# Patient Record
Sex: MALE | Race: WHITE | NOT HISPANIC OR LATINO | Employment: OTHER | URBAN - NONMETROPOLITAN AREA
[De-identification: names, ages, dates, MRNs, and addresses within clinical notes are randomized per-mention and may not be internally consistent; named-entity substitution may affect disease eponyms.]

---

## 2019-05-15 ENCOUNTER — PREP FOR SURGERY (OUTPATIENT)
Dept: OTHER | Facility: HOSPITAL | Age: 69
End: 2019-05-15

## 2019-05-15 DIAGNOSIS — R07.9 CHEST PAIN, UNSPECIFIED TYPE: Primary | ICD-10-CM

## 2019-05-15 DIAGNOSIS — Z82.49 FAMILY HISTORY OF ISCHEMIC HEART DISEASE: ICD-10-CM

## 2019-05-15 DIAGNOSIS — I45.10 RIGHT BUNDLE BRANCH BLOCK: ICD-10-CM

## 2019-05-15 RX ORDER — SODIUM CHLORIDE 0.9 % (FLUSH) 0.9 %
1-10 SYRINGE (ML) INJECTION AS NEEDED
Status: CANCELLED | OUTPATIENT
Start: 2019-05-20

## 2019-05-15 RX ORDER — SODIUM CHLORIDE 9 MG/ML
75 INJECTION, SOLUTION INTRAVENOUS CONTINUOUS
Status: CANCELLED | OUTPATIENT
Start: 2019-05-20

## 2019-05-15 RX ORDER — SODIUM CHLORIDE 0.9 % (FLUSH) 0.9 %
3 SYRINGE (ML) INJECTION EVERY 12 HOURS SCHEDULED
Status: CANCELLED | OUTPATIENT
Start: 2019-05-20

## 2019-05-19 NOTE — H&P
Cardiology History and Physical Note        Patient Name: Mati Roach  Age/Sex: 68 y.o. male  : 1950  MRN: [id number]    Date of Admission : 2019    Primary care Physician: Provider, No Known    Reason for Admission: Chest pain unstable angina pectoris left heart catheterization    Subjective:       Chief Complaint: Chest pain.       History of Present Illness:  Mati Roach is a 68 y.o. male     There is no height or weight on file to calculate BMI.  With a past medical history significant for transient ischemic attack in  with evaluation at [facility], arterial hypertension, hypertensive heart disease, hyperlipidemia, insulin requiring diabetes, gastroesophageal reflux disease, and abnormal resting electrocardiogram with right bundle branch configuration and family history for coronary artery disease.    Patient was recently evaluated at [facility] and was diagnosed with chronic obstructive lung disease.  Patient was evaluated for symptoms of lightheaded dizziness.    Patient presents for further evaluation for symptoms of chest pain.  Patient last  was sleeping  in the recliner due to symptoms of shortness of breath.  Patient has had symptoms of lightheaded dizziness.  Patient does complain of having bilateral lower extremity edema.    Patient complaining of having symptoms of chest pain radiating to the back.  Patient first episode of chest pain was on  with recurrent symptoms of chest pain on and off.  Patient has had symptoms of palpitation along with lightheaded dizziness and near syncope.  Patient resting electrocardiogram done reveals sinus rhythm with a right bundle branch configuration.  Patient on further questioning denies any nausea vomiting.  Patient denies any paroxysmal nocturnal dyspnea orthopnea.    Patient 10 point review of system except for what stated in the history of present illness and negative.      Past Medical History:  1.   Chest pain.  2.  Abnormal resting electrocardiogram with right bundle branch configuration.  3.  Arterial hypertension.  4.  Hypertensive heart disease.  5.  Hyperlipidemia.  6.  Insulin requiring diabetes.  7.  Gastroesophageal reflux disease.  8.  Transient ischemic attack in 2004 with evaluation at [facility].    Past Surgical History:  1.  Cervical spine surgery x2.  2.  Cholecystectomy.  3.  Total knee arthroplasty.  4.  Fracture of the humerus.    Family History:  Family history is significant for father who had myocardial infarction.    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  Male.  2.  Arterial hypertension.  3.  Hyperlipidemia.  4.  Diabetes.  5.  Family history for coronary artery disease.    Allergies:  Allergies not on file    Home Medication::  Prior to Admission medications    Not on File   1.   Nexium 40 mg every day.  2.  Plavix 75 mg every day.  3.  HCTZ 25 mg every day.  4.  Pravachol 40 mg at bedtime.  5.  Reglan 5 mg twice a day.  6.  Aspirin 81 mg every day.        Review of Systems   Constitutional: Negative for chills, fever and unexpected weight change.   HENT: Negative for hearing loss and nosebleeds.    Eyes: Negative for visual disturbance.   Respiratory: Positive for chest tightness and shortness of breath. Negative for cough and wheezing.    Cardiovascular: Positive for chest pain. Negative for palpitations and leg swelling.   Gastrointestinal: Negative for abdominal pain, blood in stool, constipation, diarrhea, nausea and vomiting.   Endocrine: Negative for cold intolerance, heat intolerance, polydipsia, polyphagia and polyuria.   Genitourinary: Negative for hematuria.   Musculoskeletal: Negative for joint swelling, myalgias and neck pain.   Skin: Negative for color change, rash and wound.   Neurological: Positive for light-headedness. Negative for dizziness, seizures, syncope, numbness and  headaches.   Hematological: Does not bruise/bleed easily.         Objective:     Objective:         There is no height or weight on file to calculate BMI.       Physical Exam   Constitutional: He is oriented to person, place, and time. He appears well-developed and well-nourished.   HENT:   Head: Normocephalic and atraumatic.   Left Ear: External ear normal.   Nose: Nose normal.   Mouth/Throat: Oropharynx is clear and moist.   Eyes: Conjunctivae and EOM are normal. Pupils are equal, round, and reactive to light. No scleral icterus.   Neck: Normal range of motion. Neck supple. No JVD present. No tracheal deviation present. No thyromegaly present.   Cardiovascular: Normal rate and regular rhythm.   Regular S1 and S2 with a soft systolic murmur best heard at the apex   Pulmonary/Chest: Breath sounds normal. No stridor.   Abdominal: Bowel sounds are normal.   Musculoskeletal: Normal range of motion.   Lymphadenopathy:     He has no cervical adenopathy.   Neurological: He is alert and oriented to person, place, and time. He has normal reflexes.   Skin: Skin is warm and dry.   Psychiatric: He has a normal mood and affect. His behavior is normal. Judgment and thought content normal.         Lab Review:           Invalid input(s): LABALBU, PROT                                    EKG:   ECG/EMG Results (last 24 hours)     ** No results found for the last 24 hours. **          Imaging:  Imaging Results (last 24 hours)     ** No results found for the last 24 hours. **          I personally viewed and interpreted the patient's EKG/Telemetry data.    Assessment:   1.  Chest pain.  2.  Abnormal resting echocardiogram with a right bundle branch configuration.  3.  Arterial hypertension.  4.  Hypertensive heart disease.  5.  Diabetes.          Plan:   1.  Chest pain.  Patient has had increasing episodes and frequency of chest pain suggestive of crescendo unstable angina pectoris.  Patient has been recommended to undergo a  transthoracic echocardiogram to evaluate the left ventricular systolic function.  Patient has been recommended a coronary angiogram.  Risk benefit treatment option for the coronary angiogram were discussed with the patient and informed consent was obtained.  Plan was to proceed with a coronary angiogram on 5/20/2019.  2.  Abnormal resting electrocardiogram with a right bundle branch configuration.  Patient has been recommended to undergo a head up tilt table testing with symptoms of lightheaded dizziness.  Patient is currently not on any AV blocking medication.  Patient has been recommended to continue to follow the heart rate in the blood pressure.  3.  Arterial hypertension.  Patient blood pressure 140/80.  Patient at the present time has been recommended to continue with the present dose of the nifedipine 30 mg daily along with HCTZ 25 mg daily.  4.  Hyperlipidemia.  Patient has been counseled on low-fat low-cholesterol diet and to continue with the present dose of the pravastatin.  5.  Diabetes.  Patient has been counseled on American diabetic Association diet.  Patient would be administered Mucomyst prior to the coronary angiogram and will check the renal function.  6.  Gastroesophageal reflux disease.  Patient is currently on Nexium 40 mg daily.  7.  History of transient ischemic attack.  Patient has had previous evaluation at [place].  Patient is currently on Plavix 75 mg every day.  Patient has not had any new symptoms of amaurosis fugax any weakness or tingling sensation in the upper lower extremity.    Above plan of management were discussed with the patient.        Time: time spent in face-to-face evaluation of greater than 55 minutes and interacting and formulating examining and discussing the plan with the patient with 50% of greater time spent in face-to-face interaction.    Charli Gay MD  05/19/19  6:01 PM      Dictated utilizing Dragon dictation.

## 2019-05-20 ENCOUNTER — HOSPITAL ENCOUNTER (OUTPATIENT)
Facility: HOSPITAL | Age: 69
Setting detail: HOSPITAL OUTPATIENT SURGERY
Discharge: HOME | End: 2019-05-20
Attending: INTERNAL MEDICINE | Admitting: INTERNAL MEDICINE

## 2019-05-20 VITALS
TEMPERATURE: 97.1 F | HEIGHT: 75 IN | BODY MASS INDEX: 31.3 KG/M2 | RESPIRATION RATE: 20 BRPM | DIASTOLIC BLOOD PRESSURE: 73 MMHG | WEIGHT: 251.77 LBS | HEART RATE: 68 BPM | SYSTOLIC BLOOD PRESSURE: 131 MMHG | OXYGEN SATURATION: 96 %

## 2019-05-20 DIAGNOSIS — R07.9 CHEST PAIN, UNSPECIFIED TYPE: ICD-10-CM

## 2019-05-20 DIAGNOSIS — Z82.49 FAMILY HISTORY OF ISCHEMIC HEART DISEASE: ICD-10-CM

## 2019-05-20 DIAGNOSIS — I45.10 RIGHT BUNDLE BRANCH BLOCK: ICD-10-CM

## 2019-05-20 LAB
ANION GAP SERPL CALCULATED.3IONS-SCNC: 13 MMOL/L
BASOPHILS # BLD AUTO: 0.11 10*3/MM3 (ref 0–0.2)
BASOPHILS NFR BLD AUTO: 1.5 % (ref 0–1.5)
BUN BLD-MCNC: 17 MG/DL (ref 8–23)
BUN/CREAT SERPL: 17.3 (ref 7–25)
CALCIUM SPEC-SCNC: 9.9 MG/DL (ref 8.6–10.5)
CHLORIDE SERPL-SCNC: 104 MMOL/L (ref 98–107)
CO2 SERPL-SCNC: 23 MMOL/L (ref 22–29)
CREAT BLD-MCNC: 0.98 MG/DL (ref 0.76–1.27)
DEPRECATED RDW RBC AUTO: 41.3 FL (ref 37–54)
EOSINOPHIL # BLD AUTO: 0.39 10*3/MM3 (ref 0–0.4)
EOSINOPHIL NFR BLD AUTO: 5.2 % (ref 0.3–6.2)
ERYTHROCYTE [DISTWIDTH] IN BLOOD BY AUTOMATED COUNT: 13.5 % (ref 12.3–15.4)
GFR SERPL CREATININE-BSD FRML MDRD: 76 ML/MIN/1.73
GLUCOSE BLD-MCNC: 121 MG/DL (ref 65–99)
HCT VFR BLD AUTO: 49.4 % (ref 37.5–51)
HGB BLD-MCNC: 16.1 G/DL (ref 13–17.7)
IMM GRANULOCYTES # BLD AUTO: 0.03 10*3/MM3 (ref 0–0.05)
IMM GRANULOCYTES NFR BLD AUTO: 0.4 % (ref 0–0.5)
INR PPP: 0.96 (ref 0.8–1.2)
LYMPHOCYTES # BLD AUTO: 1.57 10*3/MM3 (ref 0.7–3.1)
LYMPHOCYTES NFR BLD AUTO: 20.8 % (ref 19.6–45.3)
MCH RBC QN AUTO: 27.6 PG (ref 26.6–33)
MCHC RBC AUTO-ENTMCNC: 32.6 G/DL (ref 31.5–35.7)
MCV RBC AUTO: 84.6 FL (ref 79–97)
MONOCYTES # BLD AUTO: 0.81 10*3/MM3 (ref 0.1–0.9)
MONOCYTES NFR BLD AUTO: 10.7 % (ref 5–12)
NEUTROPHILS # BLD AUTO: 4.63 10*3/MM3 (ref 1.7–7)
NEUTROPHILS NFR BLD AUTO: 61.4 % (ref 42.7–76)
NRBC BLD AUTO-RTO: 0 /100 WBC (ref 0–0.2)
PLATELET # BLD AUTO: 288 10*3/MM3 (ref 140–450)
PMV BLD AUTO: 9.4 FL (ref 6–12)
POTASSIUM BLD-SCNC: 3.8 MMOL/L (ref 3.5–5.2)
PROTHROMBIN TIME: 12.6 SECONDS (ref 11.1–15.3)
RBC # BLD AUTO: 5.84 10*6/MM3 (ref 4.14–5.8)
SODIUM BLD-SCNC: 140 MMOL/L (ref 136–145)
WBC NRBC COR # BLD: 7.54 10*3/MM3 (ref 3.4–10.8)

## 2019-05-20 PROCEDURE — C1769 GUIDE WIRE: HCPCS | Performed by: INTERNAL MEDICINE

## 2019-05-20 PROCEDURE — 85610 PROTHROMBIN TIME: CPT | Performed by: INTERNAL MEDICINE

## 2019-05-20 PROCEDURE — 85025 COMPLETE CBC W/AUTO DIFF WBC: CPT | Performed by: INTERNAL MEDICINE

## 2019-05-20 PROCEDURE — C1894 INTRO/SHEATH, NON-LASER: HCPCS | Performed by: INTERNAL MEDICINE

## 2019-05-20 PROCEDURE — 0 IOPAMIDOL PER 1 ML: Performed by: INTERNAL MEDICINE

## 2019-05-20 PROCEDURE — C1760 CLOSURE DEV, VASC: HCPCS | Performed by: INTERNAL MEDICINE

## 2019-05-20 PROCEDURE — 80048 BASIC METABOLIC PNL TOTAL CA: CPT | Performed by: INTERNAL MEDICINE

## 2019-05-20 PROCEDURE — 93458 L HRT ARTERY/VENTRICLE ANGIO: CPT | Performed by: INTERNAL MEDICINE

## 2019-05-20 PROCEDURE — 25010000002 FENTANYL CITRATE (PF) 100 MCG/2ML SOLUTION: Performed by: INTERNAL MEDICINE

## 2019-05-20 PROCEDURE — 25010000002 MIDAZOLAM PER 1 MG: Performed by: INTERNAL MEDICINE

## 2019-05-20 RX ORDER — TAMSULOSIN HYDROCHLORIDE 0.4 MG/1
1 CAPSULE ORAL NIGHTLY
COMMUNITY

## 2019-05-20 RX ORDER — SODIUM CHLORIDE 9 MG/ML
75 INJECTION, SOLUTION INTRAVENOUS CONTINUOUS
Status: DISCONTINUED | OUTPATIENT
Start: 2019-05-20 | End: 2019-05-20

## 2019-05-20 RX ORDER — NIFEDIPINE 30 MG/1
30 TABLET, EXTENDED RELEASE ORAL TAKE AS DIRECTED
COMMUNITY

## 2019-05-20 RX ORDER — SODIUM CHLORIDE 0.9 % (FLUSH) 0.9 %
1-10 SYRINGE (ML) INJECTION AS NEEDED
Status: DISCONTINUED | OUTPATIENT
Start: 2019-05-20 | End: 2019-05-20 | Stop reason: HOSPADM

## 2019-05-20 RX ORDER — ALBUTEROL SULFATE 90 UG/1
2 AEROSOL, METERED RESPIRATORY (INHALATION) EVERY 4 HOURS PRN
COMMUNITY

## 2019-05-20 RX ORDER — CLOPIDOGREL BISULFATE 75 MG/1
75 TABLET ORAL DAILY
COMMUNITY

## 2019-05-20 RX ORDER — SODIUM CHLORIDE 0.9 % (FLUSH) 0.9 %
3 SYRINGE (ML) INJECTION EVERY 12 HOURS SCHEDULED
Status: DISCONTINUED | OUTPATIENT
Start: 2019-05-20 | End: 2019-05-20 | Stop reason: HOSPADM

## 2019-05-20 RX ORDER — FENTANYL CITRATE 50 UG/ML
INJECTION, SOLUTION INTRAMUSCULAR; INTRAVENOUS AS NEEDED
Status: DISCONTINUED | OUTPATIENT
Start: 2019-05-20 | End: 2019-05-20 | Stop reason: HOSPADM

## 2019-05-20 RX ORDER — ISOSORBIDE DINITRATE 30 MG/1
30 TABLET ORAL DAILY
COMMUNITY

## 2019-05-20 RX ORDER — MIDAZOLAM HYDROCHLORIDE 1 MG/ML
INJECTION INTRAMUSCULAR; INTRAVENOUS AS NEEDED
Status: DISCONTINUED | OUTPATIENT
Start: 2019-05-20 | End: 2019-05-20 | Stop reason: HOSPADM

## 2019-05-20 RX ORDER — LIDOCAINE HYDROCHLORIDE 20 MG/ML
INJECTION, SOLUTION INFILTRATION; PERINEURAL AS NEEDED
Status: DISCONTINUED | OUTPATIENT
Start: 2019-05-20 | End: 2019-05-20 | Stop reason: HOSPADM

## 2019-05-20 RX ORDER — ASPIRIN 81 MG/1
81 TABLET, CHEWABLE ORAL DAILY
COMMUNITY

## 2019-05-20 RX ORDER — METOCLOPRAMIDE 5 MG/1
10 TABLET ORAL NIGHTLY
COMMUNITY

## 2019-05-20 RX ORDER — GABAPENTIN 300 MG/1
300 CAPSULE ORAL 2 TIMES DAILY
COMMUNITY

## 2019-05-20 RX ORDER — SODIUM CHLORIDE 9 MG/ML
100 INJECTION, SOLUTION INTRAVENOUS CONTINUOUS
Status: DISCONTINUED | OUTPATIENT
Start: 2019-05-20 | End: 2019-05-20 | Stop reason: HOSPADM

## 2019-05-20 RX ORDER — HYDROCHLOROTHIAZIDE 25 MG/1
25 TABLET ORAL DAILY
COMMUNITY

## 2019-05-20 RX ADMIN — SODIUM CHLORIDE 75 ML/HR: 900 INJECTION, SOLUTION INTRAVENOUS at 07:52

## 2019-05-22 DIAGNOSIS — R55 SYNCOPE AND COLLAPSE: ICD-10-CM

## 2019-05-22 DIAGNOSIS — R42 DIZZINESS AND GIDDINESS: Primary | ICD-10-CM

## 2019-06-10 ENCOUNTER — HOSPITAL ENCOUNTER (OUTPATIENT)
Dept: CARDIOLOGY | Facility: HOSPITAL | Age: 69
Discharge: HOME | End: 2019-06-10
Admitting: INTERNAL MEDICINE

## 2019-06-10 DIAGNOSIS — R42 DIZZINESS AND GIDDINESS: ICD-10-CM

## 2019-06-10 DIAGNOSIS — R55 SYNCOPE AND COLLAPSE: ICD-10-CM

## 2019-06-10 PROCEDURE — 93660 TILT TABLE EVALUATION: CPT

## 2019-06-10 NOTE — H&P
Cardiology History and Physical Note        Patient Name: Mati Roach  Age/Sex: 69 y.o. male  : 1950  MRN: [id number]    Date of Admission : 6/10/2019    Primary care Physician: Kelvin Galvan MD    Reason for Admission: Lightheaded dizziness head up tilt table testing   Subjective:       Chief Complaint: Chest pain lightheaded dizziness.       History of Present Illness:  Mati Roach is a 69 y.o. male     There is no height or weight on file to calculate BMI.  With a past medical history significant for transient ischemic attack in  with evaluation at [facility], arterial hypertension, hypertensive heart disease, hyperlipidemia, insulin requiring diabetes, gastroesophageal reflux disease, and abnormal resting electrocardiogram with right bundle branch configuration and family history for coronary artery disease.    Patient was recently evaluated at [facility] and was diagnosed with chronic obstructive lung disease.  Patient was evaluated for symptoms of lightheaded dizziness.    Patient presents for further evaluation for symptoms of chest pain.  Patient last  was sleeping  in the recliner due to symptoms of shortness of breath.  Patient has had symptoms of lightheaded dizziness.  Patient does complain of having bilateral lower extremity edema.    Patient complaining of having symptoms of chest pain radiating to the back.  Patient first episode of chest pain was on  with recurrent symptoms of chest pain on and off.  Patient has had symptoms of palpitation along with lightheaded dizziness and near syncope.  Patient resting electrocardiogram done reveals sinus rhythm with a right bundle branch configuration.  Patient on further questioning denies any nausea vomiting.  Patient denies any paroxysmal nocturnal dyspnea orthopnea.      Patient underwent coronary angiogram on May 5, 2019.  Patient coronary angiogram did not reveal of any evidence of any  obstructive epicardial coronary artery disease.    Patient has continued to have symptoms of lightheaded dizziness.  Patient 10 point review of system except for what stated in the history of present illness and negative.      Past Medical History:  1.  Chest pain with coronary angiogram which did not reveal of any evidence of any obstructive epicardial coronary artery disease.  2.  Abnormal resting electrocardiogram with right bundle branch configuration.  3.  Arterial hypertension.  4.  Hypertensive heart disease.  5.  Hyperlipidemia.  6.  Insulin requiring diabetes.  7.  Gastroesophageal reflux disease.  8.  Transient ischemic attack in 2004 with evaluation at [facility].    Past Surgical History:  1.  Cervical spine surgery x2.  2.  Cholecystectomy.  3.  Total knee arthroplasty.  4.  Fracture of the humerus.    Family History:  Family history is significant for father who had myocardial infarction.    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  Male.  2.  Arterial hypertension.  3.  Hyperlipidemia.  4.  Diabetes.  5.  Family history for coronary artery disease.    Allergies:  Allergies   Allergen Reactions   • Codeine GI Intolerance   • Lortab [Hydrocodone-Acetaminophen] Other (See Comments)     fights   • Penicillins Swelling       Home Medication::  Prior to Admission medications    Not on File   1.   Nexium 40 mg every day.  2.  Plavix 75 mg every day.  3.  HCTZ 25 mg every day.  4.  Pravachol 40 mg at bedtime.  5.  Reglan 5 mg twice a day.  6.  Aspirin 81 mg every day.        Review of Systems   Constitutional: Negative for chills, fever and unexpected weight change.   HENT: Negative for hearing loss and nosebleeds.    Eyes: Negative for visual disturbance.   Respiratory: Positive for chest tightness and shortness of breath. Negative for cough and wheezing.    Cardiovascular: Positive for chest pain. Negative for  palpitations and leg swelling.   Gastrointestinal: Negative for abdominal pain, blood in stool, constipation, diarrhea, nausea and vomiting.   Endocrine: Negative for cold intolerance, heat intolerance, polydipsia, polyphagia and polyuria.   Genitourinary: Negative for hematuria.   Musculoskeletal: Negative for joint swelling, myalgias and neck pain.   Skin: Negative for color change, rash and wound.   Neurological: Positive for light-headedness. Negative for dizziness, seizures, syncope, numbness and headaches.   Hematological: Does not bruise/bleed easily.         Objective:     Objective:         There is no height or weight on file to calculate BMI.       Physical Exam   Constitutional: He is oriented to person, place, and time. He appears well-developed and well-nourished.   HENT:   Head: Normocephalic and atraumatic.   Left Ear: External ear normal.   Nose: Nose normal.   Mouth/Throat: Oropharynx is clear and moist.   Eyes: Conjunctivae and EOM are normal. Pupils are equal, round, and reactive to light. No scleral icterus.   Neck: Normal range of motion. Neck supple. No JVD present. No tracheal deviation present. No thyromegaly present.   Cardiovascular: Normal rate and regular rhythm.   Regular S1 and S2 with a soft systolic murmur best heard at the apex   Pulmonary/Chest: Breath sounds normal. No stridor.   Abdominal: Bowel sounds are normal.   Musculoskeletal: Normal range of motion.   Lymphadenopathy:     He has no cervical adenopathy.   Neurological: He is alert and oriented to person, place, and time. He has normal reflexes.   Skin: Skin is warm and dry.   Psychiatric: He has a normal mood and affect. His behavior is normal. Judgment and thought content normal.         Lab Review:           Invalid input(s): LABALBU, PROT                                    EKG:   ECG/EMG Results (last 24 hours)     ** No results found for the last 24 hours. **          Imaging:  Imaging Results (last 24 hours)     ** No  results found for the last 24 hours. **          I personally viewed and interpreted the patient's EKG/Telemetry data.    Assessment:   1.  Chest pain.  2.  Abnormal resting echocardiogram with a right bundle branch configuration.  3.  Arterial hypertension.  4.  Hypertensive heart disease.  5.  Diabetes.          Plan:   1.  Lightheaded dizziness presyncope.  Patient has been recommended to undergo a head up tilt table testing.  Patient baseline resting electrocardiogram revealed right bundle branch block.  Risk-benefit treatment option for the head up tilt table testing was discussed with the patient and informed consent was obtained.  2.  Chest pain.  Patient has had increasing episodes and frequency of chest pain suggestive of crescendo unstable angina pectoris.  Patient underwent coronary angiogram which did not reveal of any evidence of any obstructive epicardial coronary artery disease.  Patient has been recommended to work-up noncardiac etiology of chest pain.  3.   Abnormal resting electrocardiogram with a right bundle branch configuration.  Patient has been recommended to undergo a head up tilt table testing with symptoms of lightheaded dizziness.  Patient is currently not on any AV blocking medication.  Patient has been recommended to continue to follow the heart rate in the blood pressure.  4.   Arterial hypertension.  Patient blood pressure 140/80.  Patient at the present time has been recommended to continue with the present dose of the nifedipine 30 mg daily along with HCTZ 25 mg daily.  5.  Hyperlipidemia.  Patient has been counseled on low-fat low-cholesterol diet and to continue with the present dose of the pravastatin.  6.  Diabetes.  Patient has been counseled on American diabetic Association diet.  Patient would be administered Mucomyst prior to the coronary angiogram and will check the renal function.  7.  Gastroesophageal reflux disease.  Patient is currently on Nexium 40 mg daily.  8.   History of transient ischemic attack.  Patient has had previous evaluation at [place].  Patient is currently on Plavix 75 mg every day.  Patient has not had any new symptoms of amaurosis fugax any weakness or tingling sensation in the upper lower extremity.    Above plan of management were discussed with the patient.        Time: time spent in face-to-face evaluation of greater than 55 minutes and interacting and formulating examining and discussing the plan with the patient with 50% of greater time spent in face-to-face interaction.    Charli Gay MD  06/10/19  12:45 PM      Dictated utilizing Dragon dictation.

## 2019-07-25 LAB
MAXIMAL PREDICTED HEART RATE: 151 BPM
STRESS TARGET HR: 128 BPM

## 2020-12-23 DIAGNOSIS — R07.9 CHEST PAIN, UNSPECIFIED TYPE: Primary | ICD-10-CM

## 2021-01-19 ENCOUNTER — HOSPITAL ENCOUNTER (OUTPATIENT)
Dept: NUCLEAR MEDICINE | Facility: HOSPITAL | Age: 71
Discharge: HOME | End: 2021-01-19

## 2021-01-19 DIAGNOSIS — R07.9 CHEST PAIN, UNSPECIFIED TYPE: ICD-10-CM

## 2021-01-19 PROCEDURE — 0 TECHNETIUM SESTAMIBI: Performed by: INTERNAL MEDICINE

## 2021-01-19 PROCEDURE — A9500 TC99M SESTAMIBI: HCPCS | Performed by: INTERNAL MEDICINE

## 2021-01-19 PROCEDURE — 78452 HT MUSCLE IMAGE SPECT MULT: CPT

## 2021-01-19 PROCEDURE — 93017 CV STRESS TEST TRACING ONLY: CPT

## 2021-01-19 RX ADMIN — TECHNETIUM TC 99M SESTAMIBI 1 DOSE: 1 INJECTION INTRAVENOUS at 08:53

## 2021-01-20 ENCOUNTER — HOSPITAL ENCOUNTER (OUTPATIENT)
Dept: NUCLEAR MEDICINE | Facility: HOSPITAL | Age: 71
Discharge: HOME | End: 2021-01-20

## 2021-01-20 ENCOUNTER — HOSPITAL ENCOUNTER (OUTPATIENT)
Dept: CARDIOLOGY | Facility: HOSPITAL | Age: 71
Discharge: HOME | End: 2021-01-20

## 2021-01-20 PROCEDURE — 0 TECHNETIUM SESTAMIBI: Performed by: INTERNAL MEDICINE

## 2021-01-20 PROCEDURE — 25010000002 REGADENOSON 0.4 MG/5ML SOLUTION: Performed by: INTERNAL MEDICINE

## 2021-01-20 PROCEDURE — A9500 TC99M SESTAMIBI: HCPCS | Performed by: INTERNAL MEDICINE

## 2021-01-20 RX ORDER — SODIUM CHLORIDE 0.9 % (FLUSH) 0.9 %
10 SYRINGE (ML) INJECTION ONCE
Status: COMPLETED | OUTPATIENT
Start: 2021-01-20 | End: 2021-01-20

## 2021-01-20 RX ADMIN — SODIUM CHLORIDE, PRESERVATIVE FREE 10 ML: 5 INJECTION INTRAVENOUS at 09:04

## 2021-01-20 RX ADMIN — REGADENOSON 0.4 MG: 0.08 INJECTION, SOLUTION INTRAVENOUS at 09:04

## 2021-01-20 RX ADMIN — TECHNETIUM TC 99M SESTAMIBI 1 DOSE: 1 INJECTION INTRAVENOUS at 09:05

## 2021-01-20 NOTE — H&P
Cardiology History and Physical Note        Patient Name: Mati Roach  Age/Sex: 70 y.o. male  : 1950  MRN: [id number]    Date of Admission : 2021    Primary care Physician: Michael Valencia APRN    Reason for Admission: Chest pain Lexiscan Cardiolite stress test      Subjective:       Chief Complaint: Chest pain    History of Present Illness:  Mati Roach is a 70 y.o. male    Height of 6 feet 2 inches weight of 250 pounds body mask index of 32 with a past medical history significant for chest pain with previous coronary angiogram done in May 2019 which had not reveal of any evidence of any obstructive epicardial coronary artery disease, history of arterial hypertension, hypertensive heart disease, history of transient ischemic attack in  with evaluation at [facility], gastroesophageal reflux disease, abnormal resting electrocardiogram with a right bundle branch block, hyperlipidemia, and strong family history for coronary artery disease.    Patient presents for further evaluation for symptoms of chest pain.  Patient has had symptoms of substernal chest discomfort on and off.  Patient describes the chest pain as a sharp stabbing pain.  Patient symptoms of chest discomfort has been short lasting.  Patient complains of feeling tired and fatigue.  Patient denies previous sleep study evaluation.  Patient resting electrocardiogram done reveals sinus rhythm with a right bundle branch block.  Patient is continue to work on a as needed basis.  Patient symptoms of chest pain is partially reproducible.  Patient on questioning denies any exertional chest discomfort.  Patient denies any nausea vomiting.  Patient denies any prolonged episodes of palpitation.  Patient denies any symptoms of lightheaded dizziness or near syncope.    Patient had previous history of lightheaded dizziness and a positive head up tilt table testing for any neurocardiogenic etiology for  syncope    Blood pressure today is 130/70 pulse of 82 respiration of 18 oxygen saturation of 97%.    Patient resting electrocardiogram done reveals sinus rhythm with a right bundle branch block with nonspecific ST-T wave changes.    Patient 10 point review of system except for what stated in the history of present illness negative.    Concurrent Medical History:  1.  Chest pain.  2.  Coronary angiogram done in 2019 which had not reveal of any evidence of any obstructive epicardial coronary artery disease.  3.  Abnormal resting electrocardiogram with a right bundle branch block.  4.  Arterial hypertension.  5.  Hypertensive heart disease with nonrheumatic mild mitral and nonrheumatic mild tricuspid regurgitation.  6.  Hyperlipidemia.  7.  Insulin requiring diabetes.  8.  History of transient ischemic attack in 2004.  9.  Gastroesophageal reflux disease.  10.  Chronic obstructive lung disease  11.  Positive head up tilt table testing.        Past Surgical History:  1.  Cervical spine surgery x2.  2.  Cholecystectomy.  3.  Total knee arthroplasty.  4.  Fracture of the humerus.  5.  Appendectomy.  6.  Back surgery with screw and plate placement.  7.  Hernia repair.  8.  Fatty tumor lipoma resection.  9.  Coronary angiogram done in 2019     Family History:  Family history is significant for father who had myocardial infarction.     Social History: Quit smoking in 2009 denies any alcohol intake used to work in the coal [place]        Cardiac Risk factor:   1.  Male.  2.  Arterial hypertension.  3.  Hyperlipidemia.  4.  Diabetes.  5.  Family history for coronary artery disease.          Allergies:  Allergies   Allergen Reactions   • Codeine GI Intolerance   • Lortab [Hydrocodone-Acetaminophen] Other (See Comments)     fights   • Penicillins Swelling       Home Medication::  Prior to Admission medications    Medication Sig Start Date End Date Taking? Authorizing Provider   albuterol sulfate  (90 Base) MCG/ACT inhaler  Inhale 2 puffs Every 4 (Four) Hours As Needed for Wheezing.    Cornelio Barrett MD   aspirin 81 MG chewable tablet Chew 81 mg Daily.    Cornelio Barrett MD   clopidogrel (PLAVIX) 75 MG tablet Take 75 mg by mouth Daily.    Cornelio Barrett MD   Empagliflozin-metFORMIN HCl (SYNJARDY) 12.5-1000 MG tablet Take 1 tablet by mouth 2 (Two) Times a Day.    Cornelio Barrett MD   esomeprazole (nexIUM) 20 MG capsule Take 20 mg by mouth Every Morning Before Breakfast.    Cornelio Barrett MD   gabapentin (NEURONTIN) 300 MG capsule Take 300 mg by mouth 2 (Two) Times a Day. 2 in am and 3 in pm    Cornelio Barrett MD   hydrochlorothiazide (HYDRODIURIL) 25 MG tablet Take 25 mg by mouth Daily.    Cornelio Barrett MD   insulin degludec (TRESIBA FLEXTOUCH) 100 UNIT/ML solution pen-injector injection Inject 28 Units under the skin into the appropriate area as directed Daily.    Cornelio Barrett MD   isosorbide dinitrate (ISORDIL) 30 MG tablet Take 30 mg by mouth Daily.    Cornelio Barrett MD   metoclopramide (REGLAN) 5 MG tablet Take 10 mg by mouth Every Night.    Cornelio Barrett MD   NIFEdipine XL (PROCARDIA XL) 30 MG 24 hr tablet Take 30 mg by mouth Take As Directed. On Monday Wednesday Friday  And Sunday    Cornelio Barrett MD   tamsulosin (FLOMAX) 0.4 MG capsule 24 hr capsule Take 1 capsule by mouth Every Night.    Cornelio Barrett MD   umeclidinium-vilanterol (ANORO ELLIPTA) 62.5-25 MCG/INH aerosol powder  inhaler Inhale 1 puff Daily.    Cornelio Barrett MD         Review of Systems   Constitutional: Negative for chills, fever and unexpected weight change.   HENT: Negative for hearing loss and nosebleeds.    Eyes: Negative for visual disturbance.   Respiratory: Positive for chest tightness and shortness of breath. Negative for cough and wheezing.    Cardiovascular: Positive for chest pain. Negative for palpitations and leg swelling.   Gastrointestinal: Negative for  abdominal pain, blood in stool, constipation, diarrhea, nausea and vomiting.   Endocrine: Negative for cold intolerance, heat intolerance, polydipsia, polyphagia and polyuria.   Genitourinary: Negative for hematuria.   Musculoskeletal: Negative for joint swelling, myalgias and neck pain.   Skin: Negative for color change, rash and wound.   Neurological: Negative for dizziness, seizures, syncope, light-headedness, numbness and headaches.   Hematological: Does not bruise/bleed easily.         Objective:     There were no vitals taken for this visit.  Blood pressure 130/70 pulse of 82 respiration of 18 oxygen saturation of 97%  Constitutional:       Appearance: Well-developed.   Eyes:      General: No scleral icterus.     Conjunctiva/sclera: Conjunctivae normal.      Pupils: Pupils are equal, round, and reactive to light.   HENT:      Head: Normocephalic and atraumatic.      Left Ear: External ear normal.      Nose: Nose normal.   Neck:      Musculoskeletal: Normal range of motion and neck supple.      Thyroid: No thyromegaly.      Vascular: No JVD.      Trachea: No tracheal deviation.      Lymphadenopathy: No cervical adenopathy.   Pulmonary:      Breath sounds: Normal breath sounds. No stridor.   Cardiovascular:      Normal rate. Regular rhythm.   Abdominal:      General: Bowel sounds are normal.   Musculoskeletal: Normal range of motion.   Skin:     General: Skin is warm and dry.   Neurological:      Mental Status: Alert and oriented to person, place, and time.      Deep Tendon Reflexes: Reflexes are normal and symmetric.   Psychiatric:         Behavior: Behavior normal.         Thought Content: Thought content normal.         Judgment: Judgment normal.         Lab Review:           Invalid input(s): LABALBU, PROT                                    EKG:   ECG/EMG Results (last 24 hours)     ** No results found for the last 24 hours. **          Imaging:  Imaging Results (Last 24 Hours)     ** No results found for  the last 24 hours. **          I personally viewed and interpreted the patient's EKG/Telemetry data.    Assessment:   1.  Chest pain.  2.  Abnormal resting echocardiogram with a right bundle branch configuration.  3.  Arterial hypertension.  4.  Hypertensive heart disease.  5.  Diabetes.             Plan:   1.  Chest pain.  Patient had undergone previous coronary angiogram in 2019 which had not reveal of any evidence of any obstructive epicardial coronary artery disease.  Patient resting electrocardiogram done reveals sinus rhythm with a right bundle branch block.  Patient does have risk factor for coronary artery disease.  Patient has been recommended to undergo a Lexiscan Cardiolite stress test.  Risk-benefit treatment option for the Lexiscan Cardiolite stress test were discussed with the patient and an informed consent was obtained.  Plan was to proceed with a Lexiscan Cardiolite stress test.  Patient would be started on isosorbide 30 mg daily.    2.  Previous history of lightheaded dizziness.  Patient had undergone a head up tilt table testing which was positive for increase in the heart rate and the blood pressure.  Patient was recommended to increase the salt intake and to use support stockings.  Patient has not had any recurrent symptoms of lightheaded dizziness.    3.  Baseline resting electrocardiogram with a right bundle branch block is noted.      4.   Arterial hypertension.  Patient blood pressure 140/80.  Patient at the present time has been recommended to continue with the present dose of the nifedipine 30 mg daily along with HCTZ 25 mg daily.      5.  Hyperlipidemia.  Patient has been counseled on low-fat low-cholesterol diet and to continue with the present dose of the pravastatin.      6.  Diabetes.  Patient has been counseled on American diabetic Association diet.        7.  Gastroesophageal reflux disease.  Patient is currently on Nexium 40 mg daily.    8.  History of transient ischemic attack.   Patient has had previous evaluation at [facility].  Patient is currently on Plavix 75 mg every day.  Patient has not had any new symptoms of amaurosis fugax any weakness or tingling sensation in the upper lower extremity.     Above plan of management were discussed with the patient.        Time: time spent in face-to-face evaluation of greater than 55 minutes and interacting and formulating examining and discussing the plan with the patient with 50% of greater time spent in face-to-face interaction.    Electronically signed by Charli Gay MD, 01/20/21, 7:41 AM CST.      Dictated utilizing Dragon dictation.

## 2021-02-11 LAB
BH CV STRESS BP STAGE 1: NORMAL
BH CV STRESS COMMENTS STAGE 1: NORMAL
BH CV STRESS DOSE REGADENOSON STAGE 1: 0.4
BH CV STRESS DURATION MIN STAGE 1: 0
BH CV STRESS DURATION SEC STAGE 1: 10
BH CV STRESS HR STAGE 1: 81
BH CV STRESS PROTOCOL 1: NORMAL
BH CV STRESS RECOVERY BP: NORMAL MMHG
BH CV STRESS RECOVERY HR: 82 BPM
BH CV STRESS STAGE 1: 1
LV EF NUC BP: 73 %
MAXIMAL PREDICTED HEART RATE: 150 BPM
PERCENT MAX PREDICTED HR: 58 %
STRESS BASELINE BP: NORMAL MMHG
STRESS BASELINE HR: 75 BPM
STRESS PERCENT HR: 68 %
STRESS POST ESTIMATED WORKLOAD: 1 METS
STRESS POST PEAK BP: NORMAL MMHG
STRESS POST PEAK HR: 87 BPM
STRESS TARGET HR: 128 BPM

## 2022-06-03 ENCOUNTER — HOSPITAL ENCOUNTER (OUTPATIENT)
Dept: ULTRASOUND IMAGING | Facility: HOSPITAL | Age: 72
Discharge: HOME | End: 2022-06-03

## 2022-06-03 DIAGNOSIS — N50.811 TESTICULAR PAIN, RIGHT: ICD-10-CM

## 2022-06-03 PROCEDURE — 76870 US EXAM SCROTUM: CPT

## 2022-06-03 PROCEDURE — 93975 VASCULAR STUDY: CPT

## 2022-09-09 ENCOUNTER — TRANSCRIBE ORDERS (OUTPATIENT)
Dept: PODIATRY | Facility: CLINIC | Age: 72
End: 2022-09-09

## 2022-09-09 DIAGNOSIS — B07.0 PLANTAR WART: Primary | ICD-10-CM

## 2022-09-14 ENCOUNTER — TRANSCRIBE ORDERS (OUTPATIENT)
Dept: PODIATRY | Facility: CLINIC | Age: 72
End: 2022-09-14

## 2022-09-20 ENCOUNTER — OFFICE VISIT (OUTPATIENT)
Dept: PODIATRY | Facility: CLINIC | Age: 72
End: 2022-09-20

## 2022-09-20 VITALS — OXYGEN SATURATION: 95 % | BODY MASS INDEX: 31.21 KG/M2 | WEIGHT: 251 LBS | HEART RATE: 83 BPM | HEIGHT: 75 IN

## 2022-09-20 DIAGNOSIS — M79.5 FOREIGN BODY (FB) IN SOFT TISSUE: ICD-10-CM

## 2022-09-20 DIAGNOSIS — M79.671 RIGHT FOOT PAIN: Primary | ICD-10-CM

## 2022-09-20 PROCEDURE — 28190 REMOVAL OF FOOT FOREIGN BODY: CPT | Performed by: PODIATRIST

## 2022-09-20 PROCEDURE — 99203 OFFICE O/P NEW LOW 30 MIN: CPT | Performed by: PODIATRIST

## 2022-09-20 RX ORDER — ATORVASTATIN CALCIUM 20 MG/1
TABLET, FILM COATED ORAL
COMMUNITY

## 2022-09-20 RX ORDER — AZITHROMYCIN 250 MG/1
TABLET, FILM COATED ORAL
COMMUNITY
Start: 2022-08-22

## 2022-09-20 NOTE — PROGRESS NOTES
Mati Roach  1950  72 y.o. male      2022    Chief Complaint   Patient presents with   • Right Foot - Plantar Warts       History of Present Illness    Mati Roach is a 72 y.o.male came to clinic for concern of right foot pain/possible foreign body.    Past Medical History:   Diagnosis Date   • COPD (chronic obstructive pulmonary disease) (HCC)    • Diabetes mellitus (HCC)    • Hyperlipidemia    • Hypertension    • TIA (transient ischemic attack)          Past Surgical History:   Procedure Laterality Date   • APPENDECTOMY     • BACK SURGERY      neck, plates and screws   • CARDIAC CATHETERIZATION N/A 2019    Procedure: Left Heart Cath on 2019 @ 9:00 AM;  Surgeon: Charli Gay MD;  Location: [facility] CATH INVASIVE LOCATION;  Service: Cardiology   • CHOLECYSTECTOMY     • FRACTURE SURGERY      left leg   • HERNIA REPAIR     • JOINT REPLACEMENT      right knee   • TUMOR REMOVAL      fatty tumor x4         History reviewed. No pertinent family history.    Allergies   Allergen Reactions   • Codeine GI Intolerance and Other (See Comments)     Other reaction(s): Nausea And Vomiting   • Hydrocodone-Acetaminophen Other (See Comments)     fights  Other reaction(s): Other/Unknown (See Comments)  fights     • Penicillins Swelling and Rash     Other reaction(s): Angioedema       Social History     Socioeconomic History   • Marital status:    Tobacco Use   • Smoking status: Former Smoker     Quit date:      Years since quittin.7   • Smokeless tobacco: Former User   Substance and Sexual Activity   • Alcohol use: No   • Drug use: No   • Sexual activity: Defer         Current Outpatient Medications   Medication Sig Dispense Refill   • albuterol sulfate  (90 Base) MCG/ACT inhaler Inhale 2 puffs Every 4 (Four) Hours As Needed for Wheezing.     • azithromycin (ZITHROMAX) 250 MG tablet TAKE 2 TABLETS BY MOUTH TODAY FOR ONE DOSE THEN TAKE 1 TABLET ON DAYS 2 THRU 5 FOR  "INFECTION     • clopidogrel (PLAVIX) 75 MG tablet Take 75 mg by mouth Daily.     • Empagliflozin-metFORMIN HCl 12.5-1000 MG tablet Take 1 tablet by mouth 2 (Two) Times a Day.     • gabapentin (NEURONTIN) 300 MG capsule Take 300 mg by mouth 2 (Two) Times a Day. 2 in am and 3 in pm     • hydrochlorothiazide (HYDRODIURIL) 25 MG tablet Take 25 mg by mouth Daily.     • insulin degludec (TRESIBA FLEXTOUCH) 100 UNIT/ML solution pen-injector injection Inject 28 Units under the skin into the appropriate area as directed Daily.     • isosorbide dinitrate (ISORDIL) 30 MG tablet Take 30 mg by mouth Daily.     • metoclopramide (REGLAN) 5 MG tablet Take 10 mg by mouth Every Night.     • NIFEdipine XL (PROCARDIA XL) 30 MG 24 hr tablet Take 30 mg by mouth Take As Directed. On Monday Wednesday Friday  And Sunday     • tamsulosin (FLOMAX) 0.4 MG capsule 24 hr capsule Take 1 capsule by mouth Every Night.     • umeclidinium-vilanterol (ANORO ELLIPTA) 62.5-25 MCG/INH aerosol powder  inhaler Inhale 1 puff Daily.     • aspirin 81 MG chewable tablet Chew 81 mg Daily.     • atorvastatin (LIPITOR) 20 MG tablet Lipitor 20 mg tablet   Take 1 tablet every day by oral route.     • esomeprazole (nexIUM) 20 MG capsule Take 20 mg by mouth Every Morning Before Breakfast.       No current facility-administered medications for this visit.       Review of Systems   Constitutional: Negative.    HENT: Negative.    Eyes: Negative.    Respiratory: Negative.    Cardiovascular: Negative.    Endocrine: Negative.    Genitourinary: Negative.    Musculoskeletal:        Foot pain    Skin: Negative.    Allergic/Immunologic: Negative.    Neurological: Negative.    Hematological: Negative.    Psychiatric/Behavioral: Negative.          OBJECTIVE    Pulse 83   Ht 189.2 cm (74.5\")   Wt 114 kg (251 lb)   SpO2 95%   BMI 31.80 kg/m²     Physical Exam           Foreign Body Removal    Date/Time: 9/27/2022 8:27 AM  Performed by: Kem King DPM  Authorized by: " Kem King DPM   Body area: skin  General location: lower extremity  Location details: right foot  Anesthesia: local infiltration    Anesthesia:  Local Anesthetic: lidocaine 2% without epinephrine    Sedation:  Patient sedated: no    Patient restrained: no  Patient cooperative: yes  Localization method: visualized  Removal mechanism: scalpel  Dressing: antibiotic ointment and dressing applied  Tendon involvement: none  Depth: subcutaneous  Patient tolerance: patient tolerated the procedure well with no immediate complications            ASSESSMENT AND PLAN    Diagnoses and all orders for this visit:    1. Right foot pain (Primary)  -     XR Foot 3+ View Right    2. Foreign body (FB) in soft tissue    Other orders  -     Foreign Body Removal        - Patient examined and evaluated  -Radiographs taken reviewed.  -Foreign body removal right foot  -Site care as instructed  - All questions were answered   - RTC 2 weeks            This document has been electronically signed by Kem King DPM on September 27, 2022 08:27 CDT     9/27/2022  08:27 CDT

## (undated) DEVICE — PERCLOSE PROGLIDE™ SUTURE-MEDIATED CLOSURE SYSTEM: Brand: PERCLOSE PROGLIDE™

## (undated) DEVICE — KT INTRO MINISTICK MAX W/GW NITNL/TUNG ECHO 4F 21G 7CM

## (undated) DEVICE — GW PERIPH GUIDERIGHT STD/J/TP PTFE/PCOAT SS 0.038IN 5X150CM

## (undated) DEVICE — ELECTRODE,RT,STRESS,FOAM,50PK: Brand: MEDLINE

## (undated) DEVICE — MODEL BT2000 P/N 700287-012KIT CONTENTS: MANIFOLD WITH SALINE AND CONTRAST PORTS, SALINE TUBING WITH SPIKE AND HAND SYRINGE, TRANSDUCER: Brand: BT2000 AUTOMATED MANIFOLD KIT

## (undated) DEVICE — A2000 MULTI-USE SYRINGE KIT, P/N 701277-003KIT CONTENTS: 100ML CONTRAST RESERVOIR AND TUBING WITH CONTRAST SPIKE AND CLAMP: Brand: A2000 MULTI-USE SYRINGE KIT

## (undated) DEVICE — INTRO SHEATH ART/FEM ENGAGE .038 6F12CM

## (undated) DEVICE — PK CATH LAB 60

## (undated) DEVICE — MODEL AT P65, P/N 701554-001KIT CONTENTS: HAND CONTROLLER, 3-WAY HIGH-PRESSURE STOPCOCK WITH ROTATING END AND PREMIUM HIGH-PRESSURE TUBING: Brand: ANGIOTOUCH® KIT

## (undated) DEVICE — CATH DIAG EXPO M/ PK 6FR FL4/FR4 PIG 3PK